# Patient Record
Sex: FEMALE | URBAN - METROPOLITAN AREA
[De-identification: names, ages, dates, MRNs, and addresses within clinical notes are randomized per-mention and may not be internally consistent; named-entity substitution may affect disease eponyms.]

---

## 2018-03-02 ENCOUNTER — ONCOLOGY VISIT (OUTPATIENT)
Dept: ONCOLOGY | Facility: CLINIC | Age: 38
End: 2018-03-02
Attending: GENETIC COUNSELOR, MS
Payer: COMMERCIAL

## 2018-03-02 ENCOUNTER — HOSPITAL ENCOUNTER (OUTPATIENT)
Dept: LAB | Facility: CLINIC | Age: 38
End: 2018-03-02
Attending: COLON & RECTAL SURGERY
Payer: COMMERCIAL

## 2018-03-02 DIAGNOSIS — Z80.41 FAMILY HISTORY OF MALIGNANT NEOPLASM OF OVARY: ICD-10-CM

## 2018-03-02 DIAGNOSIS — Z80.3 FAMILY HISTORY OF MALIGNANT NEOPLASM OF BREAST: ICD-10-CM

## 2018-03-02 DIAGNOSIS — Z80.49 FAMILY HISTORY OF UTERINE CANCER: Primary | ICD-10-CM

## 2018-03-02 DIAGNOSIS — Z80.52 FAMILY HISTORY OF BLADDER CANCER: ICD-10-CM

## 2018-03-02 DIAGNOSIS — Z80.1 FAMILY HISTORY OF LUNG CANCER: ICD-10-CM

## 2018-03-02 DIAGNOSIS — Z80.49 FAMILY HISTORY OF UTERINE CANCER: ICD-10-CM

## 2018-03-02 PROCEDURE — 96040 ZZH GENETIC COUNSELING, EACH 30 MINUTES: CPT | Performed by: GENETIC COUNSELOR, MS

## 2018-03-02 NOTE — LETTER
Cancer Risk Management  Program Locations    [facility]  [facility]  Mailing Address  Cancer Risk Management Program  [facility]  [address]  [place] [address]    New patient appointments  [phone]  2018    June Cueva  [address] [address]      Dear June,  It was a pleasure meeting you and your friend. I am sorry to hear about your mother and will be thinking of you during the  tomorrow.    This is a summary of your genetic counseling visit. Please let me know if you have any questions.    3/2/2018    Referring Provider: self referred    Presenting Information:   I met with June Cueva today for genetic counseling at the Cancer Risk Management Program at the [facility] in [place] to discuss her family history of uterine, breast and ovarian  cancer.  She is here today to review this history, cancer screening recommendations, and available genetic testing options.    Personal History:  June is a 37 year old female.  She is healthy and does not have any personal history of cancer.  She had her first menstrual period at age 10, her first child at age 20, and June is premenopausal.  She has her ovaries, fallopian tubes and uterus in place.  By report, June's most recent OB-GYN exam and Pap smear were in the summer of  and they were normal.    She has not had a mammogram or screening for colon cancer.    Family History: (Please see scanned pedigree for detailed family history information)    Her mother recently passed away at age  65 from uterine cancer. She was diagnosed at age 64. By report, her screening test for Curiel syndrome was positive.    Her maternal aunt  in her 50's of breast cancer.    Her maternal uncle  at age 32 of lung cancer. He was  exposed to Agent Orange and also smoked.    A maternal aunt is currently 60 and was diagnosed in her 50's with leukemia.    A paternal aunt was diagnosed with ovarian cancer in her 40's. She is now in her 50's.     Her paternal grandmother currently has bladder cancer in her 80's.     Her maternal ethnicity is  and . Her paternal ethnicity is [place] and [place].  There is no known Ashkenazi Mandaeism ancestry on either side of her family.     Discussion:    June's  family history of uterine, breast and ovarian cancer may be suggestive of a hereditary cancer syndrome.    We discussed the natural history and genetics of Curiel syndrome. We discussed that there are additional genes that could cause increased risk for the cancers in June's family history of  cancer.  As many of these genes present with overlapping features in a family and accurate cancer risk cannot always be established based upon the pedigree analysis alone, it would be reasonable for June to consider panel genetic testing to analyze multiple genes at once.    The most appropriate genetic test would be the OvaNext panel from [facility].  Genetic testing is available for 25 genes associated with hereditary gynecologic cancers: OvaNext (CRISTHIAN, BARD1, BRCA1, BRCA2, BRIP1, CDH1, CHEK2, DICER1, EPCAM, MLH1, MRE11A, MSH2, MSH6, MUTYH, NBN, NF1, PALB2, PMS2, PTEN, RAD50, RAD51C, RAD51D, SMARCA4, STK11, and TP53).  We discussed that some of the genes in the OvaNext panel are associated with specific hereditary cancer syndromes and have published management guidelines: Hereditary Breast and Ovarian Cancer syndrome (BRCA1, BRCA2), Curiel syndrome (MLH1, MSH2, MSH6, PMS2, EPCAM), Hereditary Diffuse Gastric Cancer (CDH1), Cowden syndrome (PTEN), Li Fraumeni syndrome (TP53), Peutz-Jeghers syndrome (STK11), MUTYH Associated Polyposis syndrome (MUTYH), and Neurofibromatosis type 1 (NF1).    Risk-reducing salpingo-oophorectomy can be considered  in women with mutations in BRIP1, RAD51C, or RAD51D.  Breast cancer risk management guidelines are available for CRISTHIAN, CHEK2, PALB2, NF1, and NBN.  The remaining genes (BARD1, DICER1, MRE11A, RAD50, and SMARCA4) are associated with increased cancer risk and may allow us to make medical recommendations when mutations are identified.    June was provided with a brochure from the Cancer Risk Management Program explaining the testing for the gynecological and breast cancer genes.  This information can be seen in the patient instruction tab. She was also given a list of the   genes and associated cancer risks in the OvaNext panel from [facility].  Consent was obtained and genetic testing for OvaNext was sent to [facility]. Turn around time: approximately 5-6 weeks.    Based on her personal and family history, June meets current National Comprehensive Cancer Network (NCCN) criteria for genetic testing of BRCA1/2 and Curiel syndrome.      Medical Management: For June, we reviewed that the information from genetic testing may determine:    additional cancer screening for which June may qualify (i.e. mammogram and breast MRI, more frequent colonoscopies, more frequent dermatologic exams, etc.),    options for risk reducing surgeries June could consider (i.e. bilateral mastectomy, surgery to remove the ovaries and/or uterus in the case of Curiel syndrome, etc.),    These recommendations will be discussed in detail once genetic testing is completed.     Plan:  1) Today June elected to proceed with the OvaNext panel.  2) This information should be available in 5-6 weeks.  3) June will return to clinic to discuss the results.  4) June's siblings are also in the process of having genetic testing. She will notify me of their results when she returns to clinic or before her next appointment.    Face to face time: 40  minutes    Sangeeta Greene MS [facility]  Genetic Counselor  [phone]

## 2018-03-02 NOTE — MR AVS SNAPSHOT
After Visit Summary   3/2/2018    June Cueva    MRN: [id number]           Patient Information     Date Of Birth          1980        Visit Information        Provider Department      3/2/2018 12:30 PM Sangeeta Greene [facility] Cancer Risk Management Program        Today's Diagnoses     Family history of uterine cancer    -  1    Family history of malignant neoplasm of ovary        Family history of malignant neoplasm of breast        Family history of bladder cancer        Family history of lung cancer          Care Instructions          Assessing Cancer Risk  Only about 5-10% of cancers are thought to be due to an inherited cancer susceptibility gene.    These families often have:    Several people with the same or related types of cancer    Cancers diagnosed at a young age (before age 50)    Individuals with more than one primary cancer    Multiple generations of the family affected with cancer    Some people may be candidates for genetic testing of more than one gene.  For these families, genetic testing using a cancer panel may be offered.  These panels can test many genes at once known to increase the risk for gynecologic (and other) cancers:  BRCA1, BRCA2, BRIP1 MLH1, MSH2, MSH6, PMS2, EPCAM, PTEN, PALB2, RAD51C, RAD51D, and TP53. The purpose of this handout is to serve as a brief summary of the gynecologic cancer risk genes that have published clinical management guidelines for individuals who are found to carry a mutation.    ______________________________________________________________________________  Hereditary Breast and Ovarian Cancer Syndrome   (BRCA1 and BRCA2)  A single mutation in one of the copies of BRCA1 or BRCA2 increases the risk for breast and ovarian cancer, among others.  The risk for pancreatic cancer and melanoma may also be slightly increased in some families.  The chart below shows the chance that someone with a BRCA mutation would develop cancer in his or her  lifetime1,2,3,4.        A person s ethnic background is also important to consider, as individuals of Ashkenazi Evangelical ancestry have a higher chance of having a BRCA gene mutation.  There are three BRCA mutations that occur more frequently in this population.    Curiel Syndrome   (MLH1, MSH2, MSH6, PMS2, and EPCAM)  Currently five genes are known to cause Curiel Syndrome: MLH1, MSH2, MSH6, PMS2, and EPCAM.  A single mutation in one of the Curiel Syndrome genes increases the risk for colon, endometrial, ovarian, and stomach cancers.  Other cancers that occur less commonly in Curiel Syndrome include urinary tract, skin, and brain cancers.  The chart below shows the chance that a person with Curiel syndrome would develop cancer in his or her lifetime7.      *Cancer risk varies depending on Curiel syndrome gene found    Cowden Syndrome   (PTEN)  Cowden syndrome is a hereditary condition that increases the risk for breast, thyroid, endometrial, and kidney cancer.  Cowden syndrome is caused by a mutation in the PTEN gene.  A single mutation in one of the copies of PTEN causes Cowden syndrome and increases cancer risk.  The chart below shows the chance that someone with a PTEN mutation would develop cancer in their lifetime5,6.  Other benign features seen in some individuals with Cowden syndrome include benign skin lesions (facial papules, keratoses, lipomas), learning disability, autism, thyroid nodules, colon polyps, and larger head size.      *One recent study found breast cancer risk to be increased to 85%  Li-Fraumeni Syndrome   (TP53)  Li-Fraumeni Syndrome (LFS) is a cancer predisposition syndrome caused by a mutation in the TP53 gene. A single mutation in one of the copies of TP53 increases the risk for multiple cancers. Individuals with LFS are at an increased risk for developing cancer at a young age. The general lifetime risk for development of cancer is 50% by age 30 and 90% by age 60.     Core Cancers: Sarcomas,  Breast, Brain, Lung, Leukemias/Lymphomas, Adrenocortical carcinomas  Other Cancers: Gastrointestinal, Thyroid, Skin, Genitourinary    Additional Genes  PALB2  Mutations in PALB2 have been shown to increase the risk of breast cancer up to 33-58% in some families; where individuals fall within this risk range is dependent upon family history. PALB2 mutations have also been associated with increased risk for pancreatic cancer, although this risk has not been quantified yet.  Individuals who inherit two PALB2 mutations--one from their mother and one from their father--have a condition called Fanconi Anemia.  This rare autosomal recessive condition is associated with short stature, developmental delay, bone marrow failure, and increased risk for childhood cancers.    BRIP1, RAD51C and RAD51D  Mutations in BRIP1, RAD51C, and RAD51D have been shown to increase the risk of ovarian cancer as well as female breast cancer.    ______________________________________________________________________________    Inheritance  All of the cancer syndromes reviewed above are inherited in an autosomal dominant pattern.  This means that if a parent has a mutation, each of his or her children will have a 50% chance of inheriting that same mutation.  Therefore, each child--male or female--would have a 50% chance of being at increased risk for developing cancer.      Image obtained from Genetics Home Reference, 2013     Mutations in some genes can occur de lora, which means that a person s mutation occurred for the first time in them and was not inherited from a parent.  Now that they have the mutation, however, it can be passed on to future generations.    Genetic Testing  Genetic testing involves a blood test and will look for any harmful mutations that are associated with increased cancer risk.  If possible, it is recommended that the person(s) who has had cancer be tested before other family members.  That person will give us the most  useful information about whether or not a specific gene is associated with the cancer in the family.    Results  There are three possible results of genetic testing:    Positive--a harmful mutation was identified in one or more of the genes    Negative--no mutation was identified in any of the 9 genes on this panel    Variant of unknown significance--a variation in one of the genes was identified, but it is unclear how this impacts cancer risk in the family    Advantages and Disadvantages   There are advantages and disadvantages to genetic testing.  Advantages    May clarify your cancer risk    Can help you make medical decisions    May explain the cancers in your family    May give useful information to your family members (if you share your results)    Disadvantages    Possible negative emotional impact of learning about inherited cancer risk    Uncertainty in interpreting a negative test result in some situations    Possible genetic discrimination concerns (see below)    Genetic Information Nondiscrimination Act (ODALYS)  ODALYS is a federal law that protects individuals from health insurance or employment discrimination based on a genetic test result alone.  Although rare, there are currently no legal discrimination protections in terms of life insurance, long term care, or disability insurances.  Visit the National Human [employer].com Research [place] website to learn more.    Reducing Cancer Risk  Each of the genes listed within this handout have nationally recognized cancer screening guidelines that would be recommended for individuals who test positive.  In addition to increased cancer screening, surgeries may be offered or recommended to reduce cancer risk.  Recommendations are based upon an individual s genetic test result as well as their personal and family history of cancer.    Questions to Think About Regarding Genetic Testing:    What effect will the test result have on me and my relationship with my family  members if I have an inherited gene mutation?  If I don t have a gene mutation?    Should I share my test results, and how will my family react to this news, which may also affect them?    Are my children ready to learn new information that may one day affect their own health?        Hereditary Cancer Resources    FORCE: Facing Our Risk of Cancer Empowered facingourrisk.org   Bright Pink bebrightpink.org   Li-Fraumeni Syndrome Association lfsassociation.org   PTEN World PTENworld.com   Collaborative Group of the Americas on Inherited Colorectal Cancer (CGA) cgaicc.com http://www.facingourrisk.org/   Cancer Care cancercare.org   American Cancer Society (ACS) cancer.org   National Cancer [place] (NCI) cancer.gov       Cancer Risk Management Program [phone]-[phone] ([phone])  ? Sangeeta Mohanens, MS, [employer]  [phone]  ? June Cisse, MS, [employer]  [phone]  ? Christie De La Cruz, MS, [employer]  [phone]  ? Karley William, MS, [employer] Association  [phone]   ? Maylin Medel, MS, [employer] [facility] [employer]  [phone]    References  1. Emeka A, Celsa PDP, Ender S, Idania LOPES, Ronald JE, Nela JL, Ludwig N, Jef H, Annie O, Negra A, Nadir B, Nato P, Manmary S, Holli DM, Tarango N, Sera E, Lang H, Rajan E, Rufus J, Gronwald J, Tristian B, Sabrina H, Thorlacius S, Eerola H, Oscar H, Jose K, Salome OP. Average risks of breast and ovarian cancer associated with BRCA1 or BRCA2 mutations detected in case series unselected for family history: a combined analysis of 222 studies. Am J Hum Alethea. 2003;72:1117-30.  2. Navneet LEONG, Lynnette CR, Roney EVERETT.  BRCA1 and BRCA2 Hereditary Breast and Ovarian Cancer. Gene Reviews online. 2013.  3. Antoine YC, Carlos Enrique S, Leo G, Aguirre S. Breast cancer risk among male BRCA1 and BRCA2 mutation carriers. J Natl Cancer Inst. 2007;99:1811-4.  4. Ellis MCMILLAN, Yonas I, Rod J, Petros E, Perla ER, Vianca F. Risk of breast cancer in male BRCA2 carriers. J Med Alethea. 2010;47:710-1.  5. Rick MISHRA, Zeyad CONWAY,  "Paulina CONWAY, Mario ARCHIBALD, Rakesh BELTRAN, Sailaja C. Lifetime cancer risks in individuals with germline PTEN mutations. Clin Cancer Res. 2012;18:400-7.  6. Kassandra CLINE. Cowden Syndrome: A Critical Review of the Clinical Literature. J Alethea . 2009:18:13-27.  7. National Comprehensive Cancer Network. Clinical practice guidelines in oncology, colorectal cancer screening. Available online (registration required). 2015.  8. Emeka ENCARNACION et al. Breast-Cancer Risk in Families with Mutations in PALB2. NEJM. 2014; 371(6):497-506.                Follow-ups after your visit        Who to contact     If you have questions or need follow up information about today's clinic visit or your schedule please contact CANCER RISK MANAGEMENT PROGRAM directly at [phone].  Normal or non-critical lab and imaging results will be communicated to you by Listiahart, letter or phone within 4 business days after the clinic has received the results. If you do not hear from us within 7 days, please contact the clinic through Listiahart or phone. If you have a critical or abnormal lab result, we will notify you by phone as soon as possible.  Submit refill requests through [facility] or call your pharmacy and they will forward the refill request to us. Please allow 3 business days for your refill to be completed.          Additional Information About Your Visit        [facility] Information     [facility] lets you send messages to your doctor, view your test results, renew your prescriptions, schedule appointments and more. To sign up, go to [email or web address] . Click on \"Log in\" on the left side of the screen, which will take you to the Welcome page. Then click on \"Sign up Now\" on the right side of the page.     You will be asked to enter the access code listed below, as well as some personal information. Please follow the directions to create your username and password.     Your access code is: [email or web address]  Expires: 5/31/2018  1:10 PM     Your access code " will  in 90 days. If you need help or a new code, please call your [place] clinic or [phone].        Care EveryWhere ID     This is your Care EveryWhere ID. This could be used by other organizations to access your [place] medical records  [id number]         Blood Pressure from Last 3 Encounters:   No data found for BP    Weight from Last 3 Encounters:   No data found for Wt               Primary Care Provider    None Specified       No primary provider on file.        Equal Access to Services     [facility] : Hadii aad ku hadasho Soomaali, waaxda luqadaha, qaybta kaalmada adeegyada, waxay rameshin jim zavala . So [facility] [phone].    ATENCIÓN: Si habla español, tiene a barker disposición servicios gratuitos de asistencia lingüística. Llame al [phone].    We comply with applicable federal civil rights laws and [place] laws. We do not discriminate on the basis of race, color, national origin, age, disability, sex, sexual orientation, or gender identity.            Thank you!     Thank you for choosing CANCER RISK MANAGEMENT PROGRAM  for your care. Our goal is always to provide you with excellent care. Hearing back from our patients is one way we can continue to improve our services. Please take a few minutes to complete the written survey that you may receive in the mail after your visit with us. Thank you!             Your Updated Medication List - Protect others around you: Learn how to safely use, store and throw away your medicines at www.disposemymeds.org.      Notice  As of 3/2/2018  1:10 PM    You have not been prescribed any medications.

## 2018-03-02 NOTE — PATIENT INSTRUCTIONS
Assessing Cancer Risk  Only about 5-10% of cancers are thought to be due to an inherited cancer susceptibility gene.    These families often have:    Several people with the same or related types of cancer    Cancers diagnosed at a young age (before age 50)    Individuals with more than one primary cancer    Multiple generations of the family affected with cancer    Some people may be candidates for genetic testing of more than one gene.  For these families, genetic testing using a cancer panel may be offered.  These panels can test many genes at once known to increase the risk for gynecologic (and other) cancers:  BRCA1, BRCA2, BRIP1 MLH1, MSH2, MSH6, PMS2, EPCAM, PTEN, PALB2, RAD51C, RAD51D, and TP53. The purpose of this handout is to serve as a brief summary of the gynecologic cancer risk genes that have published clinical management guidelines for individuals who are found to carry a mutation.    ______________________________________________________________________________  Hereditary Breast and Ovarian Cancer Syndrome   (BRCA1 and BRCA2)  A single mutation in one of the copies of BRCA1 or BRCA2 increases the risk for breast and ovarian cancer, among others.  The risk for pancreatic cancer and melanoma may also be slightly increased in some families.  The chart below shows the chance that someone with a BRCA mutation would develop cancer in his or her lifetime1,2,3,4.        A person s ethnic background is also important to consider, as individuals of Ashkenazi Methodist ancestry have a higher chance of having a BRCA gene mutation.  There are three BRCA mutations that occur more frequently in this population.    Curiel Syndrome   (MLH1, MSH2, MSH6, PMS2, and EPCAM)  Currently five genes are known to cause Curiel Syndrome: MLH1, MSH2, MSH6, PMS2, and EPCAM.  A single mutation in one of the Curiel Syndrome genes increases the risk for colon, endometrial, ovarian, and stomach cancers.  Other cancers that occur  less commonly in Curiel Syndrome include urinary tract, skin, and brain cancers.  The chart below shows the chance that a person with Curiel syndrome would develop cancer in his or her lifetime7.      *Cancer risk varies depending on Curiel syndrome gene found    Cowden Syndrome   (PTEN)  Cowden syndrome is a hereditary condition that increases the risk for breast, thyroid, endometrial, and kidney cancer.  Cowden syndrome is caused by a mutation in the PTEN gene.  A single mutation in one of the copies of PTEN causes Cowden syndrome and increases cancer risk.  The chart below shows the chance that someone with a PTEN mutation would develop cancer in their lifetime5,6.  Other benign features seen in some individuals with Cowden syndrome include benign skin lesions (facial papules, keratoses, lipomas), learning disability, autism, thyroid nodules, colon polyps, and larger head size.      *One recent study found breast cancer risk to be increased to 85%  Li-Fraumeni Syndrome   (TP53)  Li-Fraumeni Syndrome (LFS) is a cancer predisposition syndrome caused by a mutation in the TP53 gene. A single mutation in one of the copies of TP53 increases the risk for multiple cancers. Individuals with LFS are at an increased risk for developing cancer at a young age. The general lifetime risk for development of cancer is 50% by age 30 and 90% by age 60.     Core Cancers: Sarcomas, Breast, Brain, Lung, Leukemias/Lymphomas, Adrenocortical carcinomas  Other Cancers: Gastrointestinal, Thyroid, Skin, Genitourinary    Additional Genes  PALB2  Mutations in PALB2 have been shown to increase the risk of breast cancer up to 33-58% in some families; where individuals fall within this risk range is dependent upon family history. PALB2 mutations have also been associated with increased risk for pancreatic cancer, although this risk has not been quantified yet.  Individuals who inherit two PALB2 mutations--one from their mother and one from their  father--have a condition called Fanconi Anemia.  This rare autosomal recessive condition is associated with short stature, developmental delay, bone marrow failure, and increased risk for childhood cancers.    BRIP1, RAD51C and RAD51D  Mutations in BRIP1, RAD51C, and RAD51D have been shown to increase the risk of ovarian cancer as well as female breast cancer.    ______________________________________________________________________________    Inheritance  All of the cancer syndromes reviewed above are inherited in an autosomal dominant pattern.  This means that if a parent has a mutation, each of his or her children will have a 50% chance of inheriting that same mutation.  Therefore, each child--male or female--would have a 50% chance of being at increased risk for developing cancer.      Image obtained from Genetics Home Reference, 2013     Mutations in some genes can occur de lora, which means that a person s mutation occurred for the first time in them and was not inherited from a parent.  Now that they have the mutation, however, it can be passed on to future generations.    Genetic Testing  Genetic testing involves a blood test and will look for any harmful mutations that are associated with increased cancer risk.  If possible, it is recommended that the person(s) who has had cancer be tested before other family members.  That person will give us the most useful information about whether or not a specific gene is associated with the cancer in the family.    Results  There are three possible results of genetic testing:    Positive--a harmful mutation was identified in one or more of the genes    Negative--no mutation was identified in any of the 9 genes on this panel    Variant of unknown significance--a variation in one of the genes was identified, but it is unclear how this impacts cancer risk in the family    Advantages and Disadvantages   There are advantages and disadvantages to genetic  testing.  Advantages    May clarify your cancer risk    Can help you make medical decisions    May explain the cancers in your family    May give useful information to your family members (if you share your results)    Disadvantages    Possible negative emotional impact of learning about inherited cancer risk    Uncertainty in interpreting a negative test result in some situations    Possible genetic discrimination concerns (see below)    Genetic Information Nondiscrimination Act (ODALYS)  ODALYS is a federal law that protects individuals from health insurance or employment discrimination based on a genetic test result alone.  Although rare, there are currently no legal discrimination protections in terms of life insurance, long term care, or disability insurances.  Visit the [employer] [place] website to learn more.    Reducing Cancer Risk  Each of the genes listed within this handout have nationally recognized cancer screening guidelines that would be recommended for individuals who test positive.  In addition to increased cancer screening, surgeries may be offered or recommended to reduce cancer risk.  Recommendations are based upon an individual s genetic test result as well as their personal and family history of cancer.    Questions to Think About Regarding Genetic Testing:    What effect will the test result have on me and my relationship with my family members if I have an inherited gene mutation?  If I don t have a gene mutation?    Should I share my test results, and how will my family react to this news, which may also affect them?    Are my children ready to learn new information that may one day affect their own health?        Hereditary Cancer Resources    FORCE: Facing Our Risk of Cancer Empowered facingourrisk.org   Bright Pink bebrightpink.org   Li-Fraumeni Syndrome Association lfsassociation.org   PTEN World PTENworld.com   Collaborative Group of the Americas on Inherited  Colorectal Cancer (CGA) [facility]gaWell[facility] Hospital.com [email or web address]   Cancer Care cancercare.org   American Cancer Society (ACS) cancer.org   National Cancer [place] (NCI) cancer.gov       Cancer Risk Management Program [phone]-CANCER ([phone])  ? Sangeeta Greene, MS, [facility]  [phone]  ? June Cisse, MS, [facility]  [phone]  ? Christie De La Cruz, MS, [facility]  [phone]  ? Karley William, MS, [facility]  [phone]   ? Maylin Medel, MS, [facility]  [phone]    References  1. Emeka A, Celsa PDP, Ender S, Idania LOPES, Ronald JE, Nela JL, Ludwig N, Jef H, Annie O, Negra A, Nadir B, Nato P, Manmary S, Holli DM, Sukhdeep N, Sera E, Lang H, Rajan E, Rufus J, Sean J, Tristian B, Tulrobus H, Thorlacius S, Eerola H, Nevmargaretlinna H, Jose K, Salome OP. Average risks of breast and ovarian cancer associated with BRCA1 or BRCA2 mutations detected in case series unselected for family history: a combined analysis of 222 studies. Am J Hum [place]. 2003;72:1117-30.  2. Navneet N, Lynnette M, Sim G.  BRCA1 and BRCA2 Hereditary Breast and Ovarian Cancer. Gene Reviews online. 2013.  3. Antoine YC, Carlos Enrique S, Leo G, Aguirre S. Breast cancer risk among male BRCA1 and BRCA2 mutation carriers. J Natl Cancer Inst. 2007;99:1811-4.  4. Ellis DG, Yonas I, Rod J, Petros E, Perla ER, Vianca F. Risk of breast cancer in male BRCA2 carriers. J Med [place]. 2010;47:710-1.  5. Rick MH, Zeyad J, Paulina J, Mario LA, Rakesh MS, Eng C. Lifetime cancer risks in individuals with germline PTEN mutations. Clin Cancer Res. 2012;18:400-7.  6. Kassandra CLINE. Cowden Syndrome: A Critical Review of the Clinical Literature. J [place] . 2009:18:13-27.  7. National Comprehensive Cancer Network. Clinical practice guidelines in oncology, colorectal cancer screening. Available online (registration required). 2015.  8. Emeka ENCARNACION et al. Breast-Cancer Risk in Families with Mutations in PALB2. NEJM. 2014; 371(6):497-506.      Patient needs  return appointment in 6 weeks.  Sangeeta Greene MS [facility]  Genetic Counselor  [phone]

## 2018-03-02 NOTE — LETTER
3/2/2018         RE: June Cueva  [address] [address]        Dear Colleague,    Thank you for referring your patient, June Cueva, to the CANCER RISK MANAGEMENT PROGRAM. Please see a copy of my visit note below.    3/2/2018    Referring Provider: self referred    Presenting Information:   I met with June Cueva today for genetic counseling at the Cancer Risk Management Program at the [facility] in [place] to discuss her family history of uterine, breast and ovarian  cancer.  She is here today to review this history, cancer screening recommendations, and available genetic testing options.    Personal History:  June is a 37 year old female.  She is healthy and does not have any personal history of cancer.  She had her first menstrual period at age 10, her first child at age 20, and June is premenopausal.  She has her ovaries, fallopian tubes and uterus in place.  By report, June's most recent OB-GYN exam and Pap smear were in the summer of  and they were normal.    She has not had a mammogram or screening for colon cancer.    Family History: (Please see scanned pedigree for detailed family history information)    Her mother recently passed away at age  65 from uterine cancer. She was diagnosed at age 64. By report, her screening test for Curiel syndrome was positive.    Her maternal aunt  in her 50's of breast cancer.    Her maternal uncle  at age 32 of lung cancer. He was exposed to Agent Orange and also smoked.    A maternal aunt is currently 60 and was diagnosed in her 50's with leukemia.    A paternal aunt was diagnosed with ovarian cancer in her 40's. She is now in her 50's.     Her paternal grandmother currently has bladder cancer in her 80's.     Her maternal ethnicity is  and . Her paternal ethnicity is Amharic and [place]ijani.  There is no known Ashkenazi Confucianism ancestry on either side of her family.     Discussion:    June's  family  history of uterine, breast and ovarian cancer may be suggestive of a hereditary cancer syndrome.    We discussed the natural history and genetics of Curiel syndrome. We discussed that there are additional genes that could cause increased risk for the cancers in June's family history of  cancer.  As many of these genes present with overlapping features in a family and accurate cancer risk cannot always be established based upon the pedigree analysis alone, it would be reasonable for June to consider panel genetic testing to analyze multiple genes at once.    The most appropriate genetic test would be the OvaNext panel from [employer].  Genetic testing is available for 25 genes associated with hereditary gynecologic cancers: OvaNext (CRISTHIAN, BARD1, BRCA1, BRCA2, BRIP1, CDH1, CHEK2, DICER1, EPCAM, MLH1, MRE11A, MSH2, MSH6, MUTYH, NBN, NF1, PALB2, PMS2, PTEN, RAD50, RAD51C, RAD51D, SMARCA4, STK11, and TP53).  We discussed that some of the genes in the OvaNext panel are associated with specific hereditary cancer syndromes and have published management guidelines: Hereditary Breast and Ovarian Cancer syndrome (BRCA1, BRCA2), Curiel syndrome (MLH1, MSH2, MSH6, PMS2, EPCAM), Hereditary Diffuse Gastric Cancer (CDH1), Cowden syndrome (PTEN), Li Fraumeni syndrome (TP53), Peutz-Jeghers syndrome (STK11), MUTYH Associated Polyposis syndrome (MUTYH), and Neurofibromatosis type 1 (NF1).    Risk-reducing salpingo-oophorectomy can be considered in women with mutations in BRIP1, RAD51C, or RAD51D.  Breast cancer risk management guidelines are available for CRISTHIAN, CHEK2, PALB2, NF1, and NBN.  The remaining genes (BARD1, DICER1, MRE11A, RAD50, and SMARCA4) are associated with increased cancer risk and may allow us to make medical recommendations when mutations are identified.    June was provided with a brochure from the Cancer Risk Management Program explaining the testing for the gynecological and breast cancer genes.  This  information can be seen in the patient instruction tab. She was also given a list of the   genes and associated cancer risks in the OvaNext panel from [employer].  Consent was obtained and genetic testing for OvaNext was sent to [facility]. Turn around time: approximately 5-6 weeks.    Based on her personal and family history, June meets current National Comprehensive Cancer Network (NCCN) criteria for genetic testing of BRCA1/2 and Curiel syndrome.      Medical Management: For June, we reviewed that the information from genetic testing may determine:    additional cancer screening for which June may qualify (i.e. mammogram and breast MRI, more frequent colonoscopies, more frequent dermatologic exams, etc.),    options for risk reducing surgeries June could consider (i.e. bilateral mastectomy, surgery to remove the ovaries and/or uterus in the case of Curiel syndrome, etc.),    These recommendations will be discussed in detail once genetic testing is completed.     Plan:  1) Today June elected to proceed with the OvaNext panel.  2) This information should be available in 5-6 weeks.  3) June will return to clinic to discuss the results.  4) June's siblings are also in the process of having genetic testing. She will notify me of their results when she returns to clinic or before her next appointment.    Face to face time: 40  minutes    Sangeeta Greene MS [facility]  Genetic Counselor  [phone]    Again, thank you for allowing me to participate in the care of your patient.        Sincerely,        Sangeeta Greene GC

## 2018-03-02 NOTE — PROGRESS NOTES
3/2/2018    Referring Provider: self referred    Presenting Information:   I met with June Cueva today for genetic counseling at the Cancer Risk Management Program at the [facility] in [place] to discuss her family history of uterine, breast and ovarian  cancer.  She is here today to review this history, cancer screening recommendations, and available genetic testing options.    Personal History:  June is a 37 year old female.  She is healthy and does not have any personal history of cancer.  She had her first menstrual period at age 10, her first child at age 20, and June is premenopausal.  She has her ovaries, fallopian tubes and uterus in place.  By report, June's most recent OB-GYN exam and Pap smear were in the summer of  and they were normal.    She has not had a mammogram or screening for colon cancer.    Family History: (Please see scanned pedigree for detailed family history information)    Her mother recently passed away at age  65 from uterine cancer. She was diagnosed at age 64. By report, her screening test for Curiel syndrome was positive.    Her maternal aunt  in her 50's of breast cancer.    Her maternal uncle  at age 32 of lung cancer. He was exposed to Agent Orange and also smoked.    A maternal aunt is currently 60 and was diagnosed in her 50's with leukemia.    A paternal aunt was diagnosed with ovarian cancer in her 40's. She is now in her 50's.     Her paternal grandmother currently has bladder cancer in her 80's.     Her maternal ethnicity is  and . Her paternal ethnicity is Welsh and [place].  There is no known Ashkenazi Amish ancestry on either side of her family.     Discussion:    June's  family history of uterine, breast and ovarian cancer may be suggestive of a hereditary cancer syndrome.    We discussed the natural history and genetics of Curiel syndrome. We discussed that there are additional genes that could cause increased risk for the  cancers in June's family history of  cancer.  As many of these genes present with overlapping features in a family and accurate cancer risk cannot always be established based upon the pedigree analysis alone, it would be reasonable for June to consider panel genetic testing to analyze multiple genes at once.    The most appropriate genetic test would be the OvaNext panel from [employer].  Genetic testing is available for 25 genes associated with hereditary gynecologic cancers: OvaNext (CRISTHIAN, BARD1, BRCA1, BRCA2, BRIP1, CDH1, CHEK2, DICER1, EPCAM, MLH1, MRE11A, MSH2, MSH6, MUTYH, NBN, NF1, PALB2, PMS2, PTEN, RAD50, RAD51C, RAD51D, SMARCA4, STK11, and TP53).  We discussed that some of the genes in the OvaNext panel are associated with specific hereditary cancer syndromes and have published management guidelines: Hereditary Breast and Ovarian Cancer syndrome (BRCA1, BRCA2), Curiel syndrome (MLH1, MSH2, MSH6, PMS2, EPCAM), Hereditary Diffuse Gastric Cancer (CDH1), Cowden syndrome (PTEN), Li Fraumeni syndrome (TP53), Peutz-Jeghers syndrome (STK11), MUTYH Associated Polyposis syndrome (MUTYH), and Neurofibromatosis type 1 (NF1).    Risk-reducing salpingo-oophorectomy can be considered in women with mutations in BRIP1, RAD51C, or RAD51D.  Breast cancer risk management guidelines are available for CRISTHIAN, CHEK2, PALB2, NF1, and NBN.  The remaining genes (BARD1, DICER1, MRE11A, RAD50, and SMARCA4) are associated with increased cancer risk and may allow us to make medical recommendations when mutations are identified.    June was provided with a brochure from the Cancer Risk Management Program explaining the testing for the gynecological and breast cancer genes.  This information can be seen in the patient instruction tab. She was also given a list of the   genes and associated cancer risks in the OvaNext panel from [employer].  Consent was obtained and genetic testing for OvaNext was sent to [facility].  Turn around time: approximately 5-6 weeks.    Based on her personal and family history, June meets current National Comprehensive Cancer Network (NCCN) criteria for genetic testing of BRCA1/2 and Curiel syndrome.      Medical Management: For June, we reviewed that the information from genetic testing may determine:    additional cancer screening for which June may qualify (i.e. mammogram and breast MRI, more frequent colonoscopies, more frequent dermatologic exams, etc.),    options for risk reducing surgeries June could consider (i.e. bilateral mastectomy, surgery to remove the ovaries and/or uterus in the case of Curiel syndrome, etc.),    These recommendations will be discussed in detail once genetic testing is completed.     Plan:  1) Today June elected to proceed with the OvaNext panel.  2) This information should be available in 5-6 weeks.  3) June will return to clinic to discuss the results.  4) June's siblings are also in the process of having genetic testing. She will notify me of their results when she returns to clinic or before her next appointment.    Face to face time: 40  minutes    Sangeeta Greene MS [facility]  Genetic Counselor  [phone]

## 2018-03-05 LAB — MISCELLANEOUS TEST: NORMAL

## 2018-03-29 LAB — LAB SCANNED RESULT: NORMAL

## 2018-04-10 ENCOUNTER — ONCOLOGY VISIT (OUTPATIENT)
Dept: ONCOLOGY | Facility: CLINIC | Age: 38
End: 2018-04-10
Attending: GENETIC COUNSELOR, MS
Payer: COMMERCIAL

## 2018-04-10 DIAGNOSIS — Z80.3 FAMILY HISTORY OF MALIGNANT NEOPLASM OF BREAST: ICD-10-CM

## 2018-04-10 DIAGNOSIS — Z80.52 FAMILY HISTORY OF BLADDER CANCER: ICD-10-CM

## 2018-04-10 DIAGNOSIS — Z80.41 FAMILY HISTORY OF MALIGNANT NEOPLASM OF OVARY: ICD-10-CM

## 2018-04-10 DIAGNOSIS — Z80.49 FAMILY HISTORY OF UTERINE CANCER: Primary | ICD-10-CM

## 2018-04-10 PROCEDURE — 40000072 ZZH STATISTIC GENETIC COUNSELING, < 16 MIN: Performed by: GENETIC COUNSELOR, MS

## 2018-04-10 NOTE — LETTER
"    Cancer Risk Management  Program [facility] Cancer [facility] Cancer [facility]  Mailing Address  Cancer Risk Management Program  [facility]  [address]  [place] [address]    New patient appointments  [phone]  April 10, 2018    June Cueva  [address] [address]      Dear June,  This is a summary of your genetic counseling visit to discuss your genetic test results.  Please contact me if you have any questions.    4/10/2018    Referring Provider: self referred    Presenting Information:  June Cueva returned to the Cancer Risk Management Program at the [facility]  to discuss her genetic testing results. Her blood was drawn on 3/2/2018.  The OvaNext panel was ordered from [facility].  This testing was done because of her  family history of uterine, breast and ovarian cancer.    Genetic Testing Result: NEGATIVE  June is negative for mutations in the  CRISTHIAN, BARD1, BRCA1, BRCA2, BRIP1, CDH1, CHEK2, DICER1, EPCAM, MLH1, MRE11A, MSH2, MSH6, MUTYH, NBN, NF1, PALB2, PMS2, PTEN, RAD50, RAD51C, RAD51D, SMARCA4, STK11, and TP53  genes.    No mutations were found in any of the 25 genes analyzed.  This test involved sequencing and deletion/duplication analysis of all genes with the exception of EPCAM (deletions/duplications only).    Testing did not detect an identifiable mutation associated with  Hereditary Breast and Ovarian Cancer syndrome (BRCA1, BRCA2), Curiel syndrome (MLH1, MSH2, MSH6, PMS2, EPCAM), Hereditary Diffuse Gastric Cancer (CDH1), Cowden syndrome (PTEN), Li Fraumeni syndrome (TP53), Peutz-Jeghers syndrome (STK11), MUTYH Associated Polyposis (MAP), or Neurofibromatosis type 1 (NF1).    A copy of the test report can be found in the Laboratory tab, dated 3/2/18, and named \"SEND OUTS MISC " "TEST\". The report is scanned in as a linked document.      Interpretation:  We discussed several different interpretations of this negative test result.    1. One explanation may be that there is a different gene or combination of genes and environment that are associated with the cancers in June's relatives.    2. It is possible that her mother or paternal aunt did have a mutation in one of the genes on the panel and she did not inherit it.  3. There is also a small possibility that there is a mutation in one of these genes, and we could not find it with our current testing methods.       Inheritance:  We reviewed the autosomal dominant inheritance of the CRISTHIAN, BARD1, BRCA1, BRCA2, BRIP1, CDH1, CHEK2, DICER1, EPCAM, MLH1, MRE11A, MSH2, MSH6, MUTYH, NBN, NF1, PALB2, PMS2, PTEN, RAD50, RAD51C, RAD51D, SMARCA4, STK11, and TP53 genes.  We discussed that June did not pass on an identifiable mutation in these genes to her three children based on this test result.  Mutations in these genes do not skip generations.      Additional Testing Considerations:  Although June's genetic testing result was negative, other relatives may still carry a gene mutation associated with breast, uterine and ovarian  cancer. Genetic counseling is recommended for her siblings to discuss genetic testing options.    Summary:  We do not have an explanation for June's family history of  cancer.   Genetic testing is rapidly advancing, and new cancer susceptibility genes will most likely be identified in the future.  Therefore, I encouraged June to contact me  if there are changes in her personal or family history.  This may change how we assess her cancer risk, screening, and the testing we would offer.    Plan:  1.  I provided June with a copy of her test results today, and a handout summarizing negative genetic test results (see after visit summary).  2. She plans to follow-up with her physician.  3. She should contact me  if her family " history changes.    If June has any further questions, I encouraged her to contact me at .    Time spent face to face: 10 minutes.    Sangeeta Greene MS [facility]  Genetic Counselor  [phone]

## 2018-04-10 NOTE — PROGRESS NOTES
"4/10/2018    Referring Provider: self referred    Presenting Information:  June Cueav returned to the Cancer Risk Management Program at the [facility]  to discuss her genetic testing results. Her blood was drawn on 3/2/2018.  The OvaNext panel was ordered from [facility].  This testing was done because of her  family history of uterine, breast and ovarian cancer.    Genetic Testing Result: NEGATIVE  June is negative for mutations in the  CRISTHIAN, BARD1, BRCA1, BRCA2, BRIP1, CDH1, CHEK2, DICER1, EPCAM, MLH1, MRE11A, MSH2, MSH6, MUTYH, NBN, NF1, PALB2, PMS2, PTEN, RAD50, RAD51C, RAD51D, SMARCA4, STK11, and TP53  genes.    No mutations were found in any of the 25 genes analyzed.  This test involved sequencing and deletion/duplication analysis of all genes with the exception of EPCAM (deletions/duplications only).    Testing did not detect an identifiable mutation associated with  Hereditary Breast and Ovarian Cancer syndrome (BRCA1, BRCA2), Curiel syndrome (MLH1, MSH2, MSH6, PMS2, EPCAM), Hereditary Diffuse Gastric Cancer (CDH1), Cowden syndrome (PTEN), Li Fraumeni syndrome (TP53), Peutz-Jeghers syndrome (STK11), MUTYH Associated Polyposis (MAP), or Neurofibromatosis type 1 (NF1).    A copy of the test report can be found in the Laboratory tab, dated 3/2/18, and named \"[facility]\". The report is scanned in as a linked document.      Interpretation:  We discussed several different interpretations of this negative test result.    1. One explanation may be that there is a different gene or combination of genes and environment that are associated with the cancers in June's relatives.    2. It is possible that her mother or paternal aunt did have a mutation in one of the genes on the panel and she did not inherit it.  3. There is also a small possibility that there is a mutation in one of these genes, and we could not find it with our current testing methods.       Inheritance:  We reviewed the " autosomal dominant inheritance of the CRISTHIAN, BARD1, BRCA1, BRCA2, BRIP1, CDH1, CHEK2, DICER1, EPCAM, MLH1, MRE11A, MSH2, MSH6, MUTYH, NBN, NF1, PALB2, PMS2, PTEN, RAD50, RAD51C, RAD51D, SMARCA4, STK11, and TP53 genes.  We discussed that June did not pass on an identifiable mutation in these genes to her three children based on this test result.  Mutations in these genes do not skip generations.      Additional Testing Considerations:  Although June's genetic testing result was negative, other relatives may still carry a gene mutation associated with breast, uterine and ovarian  cancer. Genetic counseling is recommended for her siblings to discuss genetic testing options.    Summary:  We do not have an explanation for June's family history of  cancer.   Genetic testing is rapidly advancing, and new cancer susceptibility genes will most likely be identified in the future.  Therefore, I encouraged June to contact me  if there are changes in her personal or family history.  This may change how we assess her cancer risk, screening, and the testing we would offer.    Plan:  1.  I provided June with a copy of her test results today, and a handout summarizing negative genetic test results (see after visit summary).  2. She plans to follow-up with her physician.  3. She should contact me  if her family history changes.    If June has any further questions, I encouraged her to contact me at .    Time spent face to face: 10 minutes.    Sangeeta Greene MS [facility]  Genetic Counselor  [phone]

## 2018-04-10 NOTE — LETTER
"    4/10/2018         RE: June Cueva  [address] [address]        Dear Colleague,    Thank you for referring your patient, June Cueva, to the CANCER RISK MANAGEMENT PROGRAM. Please see a copy of my visit note below.    4/10/2018    Referring Provider: self referred    Presenting Information:  June Cueva returned to the Cancer Risk Management Program at the [facility]  to discuss her genetic testing results. Her blood was drawn on 3/2/2018.  The OvaNext panel was ordered from [facility].  This testing was done because of her  family history of uterine, breast and ovarian cancer.    Genetic Testing Result: NEGATIVE  June is negative for mutations in the  CRISTHIAN, BARD1, BRCA1, BRCA2, BRIP1, CDH1, CHEK2, DICER1, EPCAM, MLH1, MRE11A, MSH2, MSH6, MUTYH, NBN, NF1, PALB2, PMS2, PTEN, RAD50, RAD51C, RAD51D, SMARCA4, STK11, and TP53  genes.    No mutations were found in any of the 25 genes analyzed.  This test involved sequencing and deletion/duplication analysis of all genes with the exception of EPCAM (deletions/duplications only).    Testing did not detect an identifiable mutation associated with  Hereditary Breast and Ovarian Cancer syndrome (BRCA1, BRCA2), Curiel syndrome (MLH1, MSH2, MSH6, PMS2, EPCAM), Hereditary Diffuse Gastric Cancer (CDH1), Cowden syndrome (PTEN), Li Fraumeni syndrome (TP53), Peutz-Jeghers syndrome (STK11), MUTYH Associated Polyposis (MAP), or Neurofibromatosis type 1 (NF1).    A copy of the test report can be found in the Laboratory tab, dated 3/2/18, and named \"[facility]\". The report is scanned in as a linked document.      Interpretation:  We discussed several different interpretations of this negative test result.    1. One explanation may be that there is a different gene or combination of genes and environment that are associated with the cancers in June's relatives.    2. It is possible that her mother or paternal aunt did have a mutation in " one of the genes on the panel and she did not inherit it.  3. There is also a small possibility that there is a mutation in one of these genes, and we could not find it with our current testing methods.       Inheritance:  We reviewed the autosomal dominant inheritance of the CRISTHIAN, BARD1, BRCA1, BRCA2, BRIP1, CDH1, CHEK2, DICER1, EPCAM, MLH1, MRE11A, MSH2, MSH6, MUTYH, NBN, NF1, PALB2, PMS2, PTEN, RAD50, RAD51C, RAD51D, SMARCA4, STK11, and TP53 genes.  We discussed that June did not pass on an identifiable mutation in these genes to her three children based on this test result.  Mutations in these genes do not skip generations.      Additional Testing Considerations:  Although June's genetic testing result was negative, other relatives may still carry a gene mutation associated with breast, uterine and ovarian  cancer. Genetic counseling is recommended for her siblings to discuss genetic testing options.    Summary:  We do not have an explanation for June's family history of  cancer.   Genetic testing is rapidly advancing, and new cancer susceptibility genes will most likely be identified in the future.  Therefore, I encouraged June to contact me  if there are changes in her personal or family history.  This may change how we assess her cancer risk, screening, and the testing we would offer.    Plan:  1.  I provided June with a copy of her test results today, and a handout summarizing negative genetic test results (see after visit summary).  2. She plans to follow-up with her physician.  3. She should contact me  if her family history changes.    If June has any further questions, I encouraged her to contact me at .    Time spent face to face: 10 minutes.    Sangeeta Greene [facility]  Genetic Counselor  [phone]      Again, thank you for allowing me to participate in the care of your patient.        Sincerely,        Sangeeta Greene GC

## 2018-04-10 NOTE — MR AVS SNAPSHOT
After Visit Summary   4/10/2018    June Cueva    MRN: [id number]           Patient Information     Date Of Birth          1980        Visit Information        Provider Department      4/10/2018 12:30 PM Sangeeta Greene GC Cancer Risk Management Program        Care Instructions            Negative Genetic Test Result    Genetic Testing  You had a blood test that looked at the genetic information in one or more genes associated with increased cancer risk.  The testing looked for any harmful changes that would stop this particular gene from working like it should. If an individual does not have any harmful changes or variants of unknown significance found from their blood test, their genetic test result is reported as negative.       Results  The genetic test did not identify any pathogenic (harmful) changes in the genes that were tested. There are several possible explanations for a negative test result. Without knowing the gene mutation in your family, the cause of the cancer in you or your relatives is still unknown. Your genetic counselor can help interpret the result for you and your relatives. In this case, there are several reasons that may explain the negative test result:    There may be a gene mutation in the family that you did not inherit.     You may have a gene mutation in a different gene that was not included in the test, or has not yet been discovered.     The cancers in you or your family may be due to a combination of genetic factors and environment (multifactorial/familial).    The cancers in you or your family may be sporadic/random cancers.    There is very small chance that a mutation was not found by current testing methods.  As testing technology evolves over time, it may still be possible to identify a mutation in a gene that was not found on this test.    It is important to note which genes were included in your test. A list of these genes can be found on your test  "result.    Screening Recommendations  Due to this negative test result, cancer screening recommendations should be based on your personal and family history. This may include increased cancer screening for you and/or your family members. Your genetic counselor and health care provider can help make appropriate recommendations.      Please call us if you have any questions or concerns.     Cancer Risk Management Program  [phone]-[facility]-CANCER ([phone])  ? Sangeeta Greene, MS, [facility]  [phone]  ? June Cisse, MS, [facility]  [phone]  ? Christie Jono, MS, [facility]  [phone]  ? Karley William, MS, [facility]  [phone]  ? Cyndion Gianfranco, MS, [facility]  [phone]            Follow-ups after your visit        Who to contact     If you have questions or need follow up information about today's clinic visit or your schedule please contact CANCER RISK MANAGEMENT PROGRAM directly at [phone].  Normal or non-critical lab and imaging results will be communicated to you by OnDeckhart, letter or phone within 4 business days after the clinic has received the results. If you do not hear from us within 7 days, please contact the clinic through BeneChillt or phone. If you have a critical or abnormal lab result, we will notify you by phone as soon as possible.  Submit refill requests through aka-aki networks or call your pharmacy and they will forward the refill request to us. Please allow 3 business days for your refill to be completed.          Additional Information About Your Visit        aka-aki networks Information     [employer]aki networks lets you send messages to your doctor, view your test results, renew your prescriptions, schedule appointments and more. To sign up, go to [email or web address] . Click on \"Log in\" on the left side of the screen, which will take you to the Welcome page. Then click on \"Sign up Now\" on the right side of the page.     You will be asked to enter the access code listed below, as well as some personal information. Please follow the directions to " create your username and password.     Your access code is: [email or web address]  Expires: 2018  2:10 PM     Your access code will  in 90 days. If you need help or a new code, please call your [facility] clinic or [phone].        Care EveryWhere ID     This is your Care EveryWhere ID. This could be used by other organizations to access your [facility] medical records  [id number]         Blood Pressure from Last 3 Encounters:   No data found for BP    Weight from Last 3 Encounters:   No data found for Wt              Today, you had the following     No orders found for display       Primary Care Provider Fax #    Physician No Ref-Primary [phone]       No address on file        Equal Access to Services     SAROJ HANNON : Nicki Goodman, wadulce hebert, qayblisseth kaalmada tong, berto zavala . So [facility] [phone].    ATENCIÓN: Si habla español, tiene a barker disposición servicios gratuitos de asistencia lingüística. Llame al [phone].    We comply with applicable federal civil rights laws and [place] laws. We do not discriminate on the basis of race, color, national origin, age, disability, sex, sexual orientation, or gender identity.            Thank you!     Thank you for choosing CANCER RISK MANAGEMENT PROGRAM  for your care. Our goal is always to provide you with excellent care. Hearing back from our patients is one way we can continue to improve our services. Please take a few minutes to complete the written survey that you may receive in the mail after your visit with us. Thank you!             Your Updated Medication List - Protect others around you: Learn how to safely use, store and throw away your medicines at www.disposemymeds.org.      Notice  As of 4/10/2018 12:40 PM    You have not been prescribed any medications.

## 2018-04-10 NOTE — PATIENT INSTRUCTIONS
Negative Genetic Test Result    Genetic Testing  You had a blood test that looked at the genetic information in one or more genes associated with increased cancer risk.  The testing looked for any harmful changes that would stop this particular gene from working like it should. If an individual does not have any harmful changes or variants of unknown significance found from their blood test, their genetic test result is reported as negative.       Results  The genetic test did not identify any pathogenic (harmful) changes in the genes that were tested. There are several possible explanations for a negative test result. Without knowing the gene mutation in your family, the cause of the cancer in you or your relatives is still unknown. Your genetic counselor can help interpret the result for you and your relatives. In this case, there are several reasons that may explain the negative test result:    There may be a gene mutation in the family that you did not inherit.     You may have a gene mutation in a different gene that was not included in the test, or has not yet been discovered.     The cancers in you or your family may be due to a combination of genetic factors and environment (multifactorial/familial).    The cancers in you or your family may be sporadic/random cancers.    There is very small chance that a mutation was not found by current testing methods.  As testing technology evolves over time, it may still be possible to identify a mutation in a gene that was not found on this test.    It is important to note which genes were included in your test. A list of these genes can be found on your test result.    Screening Recommendations  Due to this negative test result, cancer screening recommendations should be based on your personal and family history. This may include increased cancer screening for you and/or your family members. Your genetic counselor and health care provider can help make  appropriate recommendations.      Please call us if you have any questions or concerns.     Cancer Risk Management Program  [phone]-[facility]-CANCER ([phone])  ? Sangeeta Greene, MS, [facility]  [phone]  ? June Cisse, MS, [facility]  [phone]  ? Christie De La Cruz, MS, [facility]  [phone]  ? Karley William, MS, [facility]  [phone]  ? Maylin Medel, MS, [facility]  [phone]